# Patient Record
Sex: MALE | Race: WHITE | NOT HISPANIC OR LATINO | URBAN - METROPOLITAN AREA
[De-identification: names, ages, dates, MRNs, and addresses within clinical notes are randomized per-mention and may not be internally consistent; named-entity substitution may affect disease eponyms.]

---

## 2021-05-10 ENCOUNTER — EMERGENCY (EMERGENCY)
Facility: HOSPITAL | Age: 31
LOS: 1 days | Discharge: HOME | End: 2021-05-10
Attending: EMERGENCY MEDICINE
Payer: SELF-PAY

## 2021-05-10 VITALS
TEMPERATURE: 98 F | HEART RATE: 87 BPM | RESPIRATION RATE: 14 BRPM | WEIGHT: 255.07 LBS | OXYGEN SATURATION: 98 % | SYSTOLIC BLOOD PRESSURE: 176 MMHG | DIASTOLIC BLOOD PRESSURE: 116 MMHG

## 2021-05-10 VITALS
RESPIRATION RATE: 18 BRPM | HEART RATE: 87 BPM | SYSTOLIC BLOOD PRESSURE: 142 MMHG | TEMPERATURE: 98 F | OXYGEN SATURATION: 100 % | DIASTOLIC BLOOD PRESSURE: 93 MMHG

## 2021-05-10 LAB
ALBUMIN SERPL ELPH-MCNC: 4.5 G/DL — SIGNIFICANT CHANGE UP (ref 3.3–5)
ALP SERPL-CCNC: 45 U/L — SIGNIFICANT CHANGE UP (ref 40–120)
ALT FLD-CCNC: 44 U/L — SIGNIFICANT CHANGE UP (ref 10–45)
ANION GAP SERPL CALC-SCNC: 13 MMOL/L — SIGNIFICANT CHANGE UP (ref 5–17)
APPEARANCE UR: CLEAR — SIGNIFICANT CHANGE UP
AST SERPL-CCNC: 27 U/L — SIGNIFICANT CHANGE UP (ref 10–40)
BASOPHILS # BLD AUTO: 0.06 K/UL — SIGNIFICANT CHANGE UP (ref 0–0.2)
BASOPHILS NFR BLD AUTO: 0.5 % — SIGNIFICANT CHANGE UP (ref 0–2)
BILIRUB SERPL-MCNC: 0.4 MG/DL — SIGNIFICANT CHANGE UP (ref 0.2–1.2)
BILIRUB UR-MCNC: NEGATIVE — SIGNIFICANT CHANGE UP
BUN SERPL-MCNC: 11 MG/DL — SIGNIFICANT CHANGE UP (ref 7–23)
CALCIUM SERPL-MCNC: 9 MG/DL — SIGNIFICANT CHANGE UP (ref 8.4–10.5)
CHLORIDE SERPL-SCNC: 102 MMOL/L — SIGNIFICANT CHANGE UP (ref 96–108)
CO2 SERPL-SCNC: 23 MMOL/L — SIGNIFICANT CHANGE UP (ref 22–31)
COLOR SPEC: SIGNIFICANT CHANGE UP
CREAT SERPL-MCNC: 0.83 MG/DL — SIGNIFICANT CHANGE UP (ref 0.5–1.3)
DIFF PNL FLD: NEGATIVE — SIGNIFICANT CHANGE UP
EOSINOPHIL # BLD AUTO: 0.17 K/UL — SIGNIFICANT CHANGE UP (ref 0–0.5)
EOSINOPHIL NFR BLD AUTO: 1.3 % — SIGNIFICANT CHANGE UP (ref 0–6)
GLUCOSE SERPL-MCNC: 85 MG/DL — SIGNIFICANT CHANGE UP (ref 70–99)
GLUCOSE UR QL: NEGATIVE — SIGNIFICANT CHANGE UP
HCT VFR BLD CALC: 43.7 % — SIGNIFICANT CHANGE UP (ref 39–50)
HGB BLD-MCNC: 14.5 G/DL — SIGNIFICANT CHANGE UP (ref 13–17)
IMM GRANULOCYTES NFR BLD AUTO: 0.4 % — SIGNIFICANT CHANGE UP (ref 0–1.5)
KETONES UR-MCNC: NEGATIVE — SIGNIFICANT CHANGE UP
LEUKOCYTE ESTERASE UR-ACNC: NEGATIVE — SIGNIFICANT CHANGE UP
LYMPHOCYTES # BLD AUTO: 1.84 K/UL — SIGNIFICANT CHANGE UP (ref 1–3.3)
LYMPHOCYTES # BLD AUTO: 14.3 % — SIGNIFICANT CHANGE UP (ref 13–44)
MCHC RBC-ENTMCNC: 29.2 PG — SIGNIFICANT CHANGE UP (ref 27–34)
MCHC RBC-ENTMCNC: 33.2 GM/DL — SIGNIFICANT CHANGE UP (ref 32–36)
MCV RBC AUTO: 87.9 FL — SIGNIFICANT CHANGE UP (ref 80–100)
MONOCYTES # BLD AUTO: 1.32 K/UL — HIGH (ref 0–0.9)
MONOCYTES NFR BLD AUTO: 10.2 % — SIGNIFICANT CHANGE UP (ref 2–14)
NEUTROPHILS # BLD AUTO: 9.45 K/UL — HIGH (ref 1.8–7.4)
NEUTROPHILS NFR BLD AUTO: 73.3 % — SIGNIFICANT CHANGE UP (ref 43–77)
NITRITE UR-MCNC: NEGATIVE — SIGNIFICANT CHANGE UP
NRBC # BLD: 0 /100 WBCS — SIGNIFICANT CHANGE UP (ref 0–0)
PH UR: 6.5 — SIGNIFICANT CHANGE UP (ref 5–8)
PLATELET # BLD AUTO: 256 K/UL — SIGNIFICANT CHANGE UP (ref 150–400)
POTASSIUM SERPL-MCNC: 3.8 MMOL/L — SIGNIFICANT CHANGE UP (ref 3.5–5.3)
POTASSIUM SERPL-SCNC: 3.8 MMOL/L — SIGNIFICANT CHANGE UP (ref 3.5–5.3)
PROT SERPL-MCNC: 7.5 G/DL — SIGNIFICANT CHANGE UP (ref 6–8.3)
PROT UR-MCNC: SIGNIFICANT CHANGE UP
RBC # BLD: 4.97 M/UL — SIGNIFICANT CHANGE UP (ref 4.2–5.8)
RBC # FLD: 12.6 % — SIGNIFICANT CHANGE UP (ref 10.3–14.5)
SODIUM SERPL-SCNC: 138 MMOL/L — SIGNIFICANT CHANGE UP (ref 135–145)
SP GR SPEC: 1.02 — SIGNIFICANT CHANGE UP (ref 1.01–1.02)
UROBILINOGEN FLD QL: NEGATIVE — SIGNIFICANT CHANGE UP
WBC # BLD: 12.89 K/UL — HIGH (ref 3.8–10.5)
WBC # FLD AUTO: 12.89 K/UL — HIGH (ref 3.8–10.5)

## 2021-05-10 PROCEDURE — 85025 COMPLETE CBC W/AUTO DIFF WBC: CPT

## 2021-05-10 PROCEDURE — 99285 EMERGENCY DEPT VISIT HI MDM: CPT

## 2021-05-10 PROCEDURE — 81003 URINALYSIS AUTO W/O SCOPE: CPT

## 2021-05-10 PROCEDURE — 99284 EMERGENCY DEPT VISIT MOD MDM: CPT | Mod: 25

## 2021-05-10 PROCEDURE — 74177 CT ABD & PELVIS W/CONTRAST: CPT

## 2021-05-10 PROCEDURE — 74177 CT ABD & PELVIS W/CONTRAST: CPT | Mod: 26,MA

## 2021-05-10 PROCEDURE — 80053 COMPREHEN METABOLIC PANEL: CPT

## 2021-05-10 PROCEDURE — 87086 URINE CULTURE/COLONY COUNT: CPT

## 2021-05-10 PROCEDURE — 76705 ECHO EXAM OF ABDOMEN: CPT | Mod: 26

## 2021-05-10 PROCEDURE — 36000 PLACE NEEDLE IN VEIN: CPT

## 2021-05-10 PROCEDURE — 76705 ECHO EXAM OF ABDOMEN: CPT

## 2021-05-10 RX ORDER — IBUPROFEN 200 MG
400 TABLET ORAL ONCE
Refills: 0 | Status: COMPLETED | OUTPATIENT
Start: 2021-05-10 | End: 2021-05-10

## 2021-05-10 RX ADMIN — Medication 400 MILLIGRAM(S): at 18:27

## 2021-05-10 NOTE — ED PROVIDER NOTE - NSPTACCESSSVCSAPPTDETAILS_ED_ALL_ED_FT
URGENT: Please arrange for follow up with hematology-oncology ASAP for follow up visit regarding findings on CT scan concerning for cancer/malignancy. URGENT: Please arrange for follow up with Urology at the [facility] ASAP for follow up visit regarding findings on CT scan concerning for cancer/malignancy.     URGENT: Please arrange for follow up with a primary care provider ASAP for follow up visit regarding findings on CT scan concerning for cancer/malignancy.

## 2021-05-10 NOTE — ED PROVIDER NOTE - NS ED ROS FT
Gen: Denies fever.   HEENT: Denies headache. Denies congestion.  CV: Denies chest pain. Denies lightheadedness.  Skin: Denies rash.   Resp: Denies SOB. Denies cough.  GI: +abd pain. +nausea. Denies vomiting. +diarrhea. Denies melena. Denies hematochezia.  Msk: Denies extremity swelling. Denies extremity pain.  : Denies dysuria. Denies hematuria.  Neuro: Denies LOC. Denies dizziness. Denies new numbness/tingling. Denies new focal weakness.  Psych: Denies SI

## 2021-05-10 NOTE — ED PROVIDER NOTE - SHIFT CHANGE DETAILS
Attending note (Lee): I have received sign out on this patient, briefly: 31M p/w RLQ pain, concerning for appendicitis; US nondiagnostic, pending CT-A/P.

## 2021-05-10 NOTE — ED PROVIDER NOTE - NSFOLLOWUPINSTRUCTIONS_ED_ALL_ED_FT
1. You presented to the emergency department for:  right sided abdominal pain    2. Your evaluation in the emergency department included a physician evaluation and testing consisting of: CAT scan and lab work. Your tests did not reveal any findings indicating the need for admission to the hospital or an emergent intervention at this time.     3. It is recommended that you follow-up with a provider for an ongoing evaluation of the findings on your CT scan today as discussed. You will need to have an MRI done to better determine the cause of the lesion on your kidney. You may follow up with your personal provider for this. In addition, your case has been discussed with [facility]-oncology should you need their service. You will be called to arrange an appointment with them, and can have this planned for a time that is best for you.    If needed, to arrange an appointment with a primary care provider please call: [phone]    4. For pain you may take:    500-1000mg Tylenol every 8 hours - as needed.  This is an over-the-counter medication - please read the warnings on the label. This medication comes with certain risks and side effects that are outlined on the packaging, especially if you are taking it for a prolonged period of time. If you have any questions regarding its use, you may refer them to your local pharmacist.    or    You can use 400-600mg Ibuprofen (such as motrin or advil) every 6 to 8 hours as needed for pain control.  Take ibuprofen with food or milk to lessen stomach upset.  This is an over-the-counter medication please respect the warnings on the label. All medications come with certain risks and side effects that you need to discuss with your doctor, especially if you are taking them for a prolonged period (beyond 3-4 days).      5. AS A GENERAL PRECAUTION, PLEASE RETURN TO THE EMERGENCY DEPARTMENT IMMEDIATELY IF you have any fevers, uncontrollable nausea and vomiting, lightheadedness, inability to tolerate eating and drinking, difficulty breathing, chest pain, severe increase in your symptoms or pain, or an other new symptoms that concern you. 1. You presented to the emergency department for:  right sided abdominal pain    2. Your evaluation in the emergency department included a physician evaluation and testing consisting of: CAT scan and lab work. Your tests did not reveal any findings indicating the need for admission to the hospital or an emergent intervention at this time.     3. It is recommended that you follow-up with a provider for an ongoing evaluation of the findings on your CT scan today as discussed. You will need to have an MRI done to better determine the cause of the lesion on your kidney. You may follow up with your personal provider for this. In addition, your case has been discussed with [facility]rology should you need their service. You will be called to arrange an appointment with them this week, and can have this planned for a time that is best for you. The phone number is also available in your paper work.    If needed, to arrange an appointment with a primary care provider please call: [phone]    4. For pain you may take:    500-1000mg Tylenol every 8 hours - as needed.  This is an over-the-counter medication - please read the warnings on the label. This medication comes with certain risks and side effects that are outlined on the packaging, especially if you are taking it for a prolonged period of time. If you have any questions regarding its use, you may refer them to your local pharmacist.    or    You can use 400-600mg Ibuprofen (such as motrin or advil) every 6 to 8 hours as needed for pain control.  Take ibuprofen with food or milk to lessen stomach upset.  This is an over-the-counter medication please respect the warnings on the label. All medications come with certain risks and side effects that you need to discuss with your doctor, especially if you are taking them for a prolonged period (beyond 3-4 days).      5. AS A GENERAL PRECAUTION, PLEASE RETURN TO THE EMERGENCY DEPARTMENT IMMEDIATELY IF you have any fevers, uncontrollable nausea and vomiting, lightheadedness, inability to tolerate eating and drinking, difficulty breathing, chest pain, severe increase in your symptoms or pain, or an other new symptoms that concern you.

## 2021-05-10 NOTE — ED PROVIDER NOTE - CLINICAL SUMMARY MEDICAL DECISION MAKING FREE TEXT BOX
32 yo M hx of kidney stone, no other pertinent PMH presenting for RLQ abd pain x 24 hrs. Exam as above. Concern for appy, UTI, nephrolithiasis, MSK strain, gastroenteritis, low concern for testicular etiology, and colitis. Labs, imaging, reassess.

## 2021-05-10 NOTE — ED PROVIDER NOTE - PROGRESS NOTE DETAILS
Jaswinder Ibrahim PGY1: Results thus far reviewed, no findings suggesting need for emergent intervention, however pt does have complex lesion of his right kidney. The findings and differential were discussed with the pt. The differential is broad. Given findings, and potential for malignant process, pt was offered admission for further work up, however he would prefer to follow up outpt with his partner's family's physician and their associated providers. Will arrange for outpt urgent follow up with [facility] heme/onc in the event that he has difficulty obtaining appropriate follow up, as pt will need MR to further characterize findings. Pt understands plan, and has been provided with return precautions, and understands reasons to return to the ER. PGY 1 Jaswinder Ibrahim: Urology recommending follow up with urology. Urology recommending [facility]. Will arrange for urgent follow up. Attending note (Lee): Patient found on CT to have complex cystic structure on CT.  I spoke with both hem/onc fellow and urology PA by phone; recommendations based on this conversation are to have him f/u with physician at [facility]; patient offered admission but declined and wants to continue with outpatient w/u; will put note in for f/u coordinator to assist with outpatient appointment with urology, copy of results provided to patient.

## 2021-05-10 NOTE — ED ADULT NURSE NOTE - OBJECTIVE STATEMENT
31 yr old male from home c/o RLQ pain x 24 hrs, +nausea/chills without vomiting/fever, +diarrhea x 1, multiple formed stools yesterday, pain slightly radiating to back, hx renal stone, denies bowel/bladder c/o, on exam, clear lungs, abd soft mildly tender to RLQ, skin wdi, vitals stable, +admits to "white coat syndrome" - referring to elevated BP

## 2021-05-10 NOTE — ED PROVIDER NOTE - NSFOLLOWUPCLINICS_GEN_ALL_ED_FT
[facility]  Hematology/Oncology  [address] [id number]  Phone: [phone]  Fax:   Follow Up Time: Urgent     [facility]  Urology  [address] [place] [address] - [address] [place] [address]  Phone: [phone]  Fax:   Follow Up Time: Urgent

## 2021-05-10 NOTE — ED PROVIDER NOTE - PHYSICAL EXAMINATION
Gen: A&Ox4.   HEENT: Atraumatic. No pharyngeal erythema or exudates. Mucous membranes moist, no scleral icterus.   CV: RRR. No M/R/G. No significant LE edema. Distal pulses intact. Capillary refill < 2 seconds.  Resp: Normal effort. CTAB, no rales, rhonchi, or wheezes.  GI: Abdomen minimally tender in RLQ, otherwise non tender to palpation, soft and non-distended. + BS. No testicular tenderness appreciated.  MSK: No open wounds. No ecchymosis appreciated.  Neuro: Following commands. Moving extremities spontaneously.  Psych: Appropriate mood, cooperative

## 2021-05-10 NOTE — ED PROVIDER NOTE - PATIENT PORTAL LINK FT
You can access the FollowMyHealth Patient Portal offered by [facility] by registering at the following website: [email or web address]. By joining [facility]’s FollowMyHealth portal, you will also be able to view your health information using other applications (apps) compatible with our system.

## 2021-05-10 NOTE — ED PROVIDER NOTE - OBJECTIVE STATEMENT
30 yo M hx of  no pertiennt PMH presenting for RLQ abd pain.    Denies N/V, fevers, chills, 32 yo M hx of no pertinent PMH presenting for RLQ abd pain.    Denies N/V, fevers, chills, 30 yo M hx of kidney stone, no other pertinent PMH presenting for RLQ abd pain x 24 hrs, increasing and intermittent, occasionally radiating to right back. Pt presently with minimal pain. States pain is positional at times. +nausea/chills. No vomiting or fever. One episode of vomiting yesterday but now formed stool. No blood in stool. Denies dysuria, hematuria, and testicular pain.      Denies N/V, fevers, chills, 30 yo M hx of kidney stone, no other pertinent PMH presenting for RLQ abd pain x 24 hrs, increasing and intermittent, occasionally radiating to right back. Pt presently with minimal pain. States pain is positional at times. +nausea/chills. No vomiting or fever. One episode of vomiting yesterday but now formed stool. No blood in stool. Denies dysuria, hematuria, and testicular pain.      Denies N/V, fevers, chills,    Attendinyo male presents with abdominal pain, localizing to the right lower part of the belly since yesterday.  no fever.  had diarrhea yesterday.

## 2021-05-11 LAB
CULTURE RESULTS: NO GROWTH — SIGNIFICANT CHANGE UP
SPECIMEN SOURCE: SIGNIFICANT CHANGE UP